# Patient Record
Sex: MALE | Race: WHITE
[De-identification: names, ages, dates, MRNs, and addresses within clinical notes are randomized per-mention and may not be internally consistent; named-entity substitution may affect disease eponyms.]

---

## 2021-01-25 NOTE — NUR
Patient in room PCU 3018A. I have received report from WHITLEY ALICEA IN ER and had the 
opportunity to ask questions and assume patient care.

## 2021-01-25 NOTE — NUR
Problems reprioritized. Patient report given, questions answered & plan of care reviewed 
with WHITLEY BROUSSARD.

## 2021-01-26 NOTE — NUR
Patient in room PCU 3018. I have received report from  Mariana ARREAGA  and had the opportunity to 
ask questions and assume patient care.

## 2021-01-26 NOTE — NUR
Problems reprioritized. Patient report given, questions answered & plan of care reviewed 
with Mariana ARREAGA.

## 2021-01-27 NOTE — NUR
Problems reprioritized. Patient report given, questions answered & plan of care reviewed 
with WHITLEY Mercado.

## 2021-01-27 NOTE — NUR
Patient in room PCU 3018. I have received report from ricky RIVAS   and had the opportunity to 
ask questions and assume patient care.

## 2021-01-27 NOTE — NUR
Patient in room PCU 3018. I have received report from WHITLEY Greer   and had the opportunity 
to ask questions and assume patient care.

## 2021-01-28 NOTE — NUR
Problems reprioritized. Patient report given, questions answered & plan of care reviewed 
with WHITLEY Yadav.

## 2021-01-28 NOTE — NUR
Patient in room PCU 3018. I have received report from  CARMEN ARREAGA  and had the opportunity 
to ask questions and assume patient care. PT SLEEPING. NO DISTRESS. CALL LIGHT IN REACH

-------------------------------------------------------------------------------

Addendum: 01/28/21 at 1158 by Vicenta Saucedo RN

-------------------------------------------------------------------------------

Amended: Links added.

## 2021-01-28 NOTE — NUR
Problems reprioritized. Patient report given, questions answered & plan of care reviewed 
with GENE ARREAGA. 

-------------------------------------------------------------------------------

Addendum: 01/28/21 at 1226 by Vicenta Saucedo RN

-------------------------------------------------------------------------------

Amended: Links added.

## 2021-01-28 NOTE — NUR
Patient in room PCU 3018. I have received report from WHITLEY Goodman   and had the opportunity 
to ask questions and assume patient care.

## 2021-01-28 NOTE — NUR
Patient in room PCU 3018. I have received report from  Vicenta Saucedo RN and had the 
opportunity to ask questions and assume patient care.

## 2021-01-29 NOTE — NUR
Patient in room PCU 3018. I have received report from  WHITLEY Mercado  and had the opportunity 
to ask questions and assume patient care.

## 2021-01-29 NOTE — NUR
Problems reprioritized. Patient report given, questions answered & plan of care reviewed 
with WHITLEY Goodman.

## 2021-01-29 NOTE — NUR
Patient stable for discharge per md orders. Strict return precautions given to patient 
including follow up with Dr. Pineda in two weeks. Urinary catheter leg bag given to 
patient. IV taken out with cannula intact. Ride to the [employer] [facility] was arranged 
but patient refused, and stated " I'd rather walk." Patient belongings gathered. Telemetry 
monitor taken off, wrist bands cut off. Patient ambulated to lobby and outside into parking 
lot, where he was seen leaving premises.

## 2021-06-20 ENCOUNTER — HOSPITAL ENCOUNTER (EMERGENCY)
Dept: HOSPITAL 94 - ER | Age: 56
Discharge: HOME | End: 2021-06-20
Payer: MEDICAID

## 2021-06-20 VITALS — DIASTOLIC BLOOD PRESSURE: 74 MMHG | SYSTOLIC BLOOD PRESSURE: 135 MMHG

## 2021-06-20 VITALS — HEIGHT: 70 IN | WEIGHT: 147.71 LBS | BODY MASS INDEX: 21.15 KG/M2

## 2021-06-20 DIAGNOSIS — G89.29: ICD-10-CM

## 2021-06-20 DIAGNOSIS — Z79.2: ICD-10-CM

## 2021-06-20 DIAGNOSIS — J44.9: ICD-10-CM

## 2021-06-20 DIAGNOSIS — Z90.89: ICD-10-CM

## 2021-06-20 DIAGNOSIS — M67.432: ICD-10-CM

## 2021-06-20 DIAGNOSIS — F15.90: ICD-10-CM

## 2021-06-20 DIAGNOSIS — Z79.899: ICD-10-CM

## 2021-06-20 DIAGNOSIS — E78.00: ICD-10-CM

## 2021-06-20 DIAGNOSIS — F41.9: ICD-10-CM

## 2021-06-20 DIAGNOSIS — I10: ICD-10-CM

## 2021-06-20 DIAGNOSIS — Z59.0: ICD-10-CM

## 2021-06-20 DIAGNOSIS — F17.200: ICD-10-CM

## 2021-06-20 DIAGNOSIS — L03.114: Primary | ICD-10-CM

## 2021-06-20 DIAGNOSIS — F12.90: ICD-10-CM

## 2021-06-20 DIAGNOSIS — Z87.440: ICD-10-CM

## 2021-06-20 PROCEDURE — 99284 EMERGENCY DEPT VISIT MOD MDM: CPT

## 2021-06-20 PROCEDURE — 10160 PNXR ASPIR ABSC HMTMA BULLA: CPT

## 2021-06-20 SDOH — ECONOMIC STABILITY - HOUSING INSECURITY: HOMELESSNESS: Z59.0

## 2021-06-20 NOTE — NUR
ABOUT A 3 CM BY 3 CM RAISED LUMP ON MEDIAL LEFT WRIST ASPIRATED BY DR TERAN: 
FLUID CLEAR: PATIETN AWARE THAT THE LUMP MAY BE A GANGLION CYST

## 2022-04-26 ENCOUNTER — HOSPITAL ENCOUNTER (EMERGENCY)
Dept: HOSPITAL 94 - ER | Age: 57
LOS: 1 days | Discharge: HOME | End: 2022-04-27
Payer: MEDICAID

## 2022-04-26 VITALS — SYSTOLIC BLOOD PRESSURE: 130 MMHG | DIASTOLIC BLOOD PRESSURE: 96 MMHG

## 2022-04-26 VITALS — WEIGHT: 216.27 LBS | BODY MASS INDEX: 30.96 KG/M2 | HEIGHT: 70 IN

## 2022-04-26 DIAGNOSIS — Z90.89: ICD-10-CM

## 2022-04-26 DIAGNOSIS — Z59.00: ICD-10-CM

## 2022-04-26 DIAGNOSIS — F15.90: ICD-10-CM

## 2022-04-26 DIAGNOSIS — F12.90: ICD-10-CM

## 2022-04-26 DIAGNOSIS — I10: ICD-10-CM

## 2022-04-26 DIAGNOSIS — G89.29: ICD-10-CM

## 2022-04-26 DIAGNOSIS — F41.9: ICD-10-CM

## 2022-04-26 DIAGNOSIS — J44.9: ICD-10-CM

## 2022-04-26 DIAGNOSIS — Z79.899: ICD-10-CM

## 2022-04-26 DIAGNOSIS — E78.00: ICD-10-CM

## 2022-04-26 DIAGNOSIS — Z87.440: ICD-10-CM

## 2022-04-26 DIAGNOSIS — Z79.2: ICD-10-CM

## 2022-04-26 DIAGNOSIS — K13.0: Primary | ICD-10-CM

## 2022-04-26 DIAGNOSIS — F17.200: ICD-10-CM

## 2022-04-26 PROCEDURE — 83605 ASSAY OF LACTIC ACID: CPT

## 2022-04-26 PROCEDURE — 99283 EMERGENCY DEPT VISIT LOW MDM: CPT

## 2022-04-26 PROCEDURE — 80053 COMPREHEN METABOLIC PANEL: CPT

## 2022-04-26 PROCEDURE — 85025 COMPLETE CBC W/AUTO DIFF WBC: CPT

## 2022-04-26 PROCEDURE — 36415 COLL VENOUS BLD VENIPUNCTURE: CPT

## 2022-04-26 SDOH — ECONOMIC STABILITY - HOUSING INSECURITY: HOMELESSNESS UNSPECIFIED: Z59.00

## 2022-04-27 LAB
ALBUMIN SERPL BCP-MCNC: 3.4 G/DL (ref 3.4–5)
ALBUMIN/GLOB SERPL: 0.8 {RATIO} (ref 1.1–1.5)
ALP SERPL-CCNC: 86 IU/L (ref 46–116)
ALT SERPL W P-5'-P-CCNC: 23 U/L (ref 12–78)
ANION GAP SERPL CALCULATED.3IONS-SCNC: 10 MMOL/L (ref 8–16)
AST SERPL W P-5'-P-CCNC: 14 U/L (ref 10–37)
BASOPHILS # BLD AUTO: 0.1 X10'3 (ref 0–0.2)
BASOPHILS NFR BLD AUTO: 0.6 % (ref 0–1)
BILIRUB SERPL-MCNC: 0.6 MG/DL (ref 0.1–1)
BUN SERPL-MCNC: 20 MG/DL (ref 7–18)
BUN/CREAT SERPL: 15.9 (ref 5.4–32)
CALCIUM SERPL-MCNC: 8.6 MG/DL (ref 8.5–10.1)
CHLORIDE SERPL-SCNC: 102 MMOL/L (ref 99–107)
CO2 SERPL-SCNC: 24 MMOL/L (ref 24–32)
CREAT SERPL-MCNC: 1.26 MG/DL (ref 0.6–1.1)
EOSINOPHIL # BLD AUTO: 0.2 X10'3 (ref 0–0.9)
EOSINOPHIL NFR BLD AUTO: 2.4 % (ref 0–6)
ERYTHROCYTE [DISTWIDTH] IN BLOOD BY AUTOMATED COUNT: 14.7 % (ref 11.5–14.5)
GFR SERPL CREATININE-BSD FRML MDRD: 59 ML/MIN
GLUCOSE SERPL-MCNC: 102 MG/DL (ref 70–104)
HCT VFR BLD AUTO: 39.7 % (ref 42–52)
HGB BLD-MCNC: 13.2 G/DL (ref 14–17.9)
LYMPHOCYTES # BLD AUTO: 1.8 X10'3 (ref 1.1–4.8)
LYMPHOCYTES NFR BLD AUTO: 18.5 % (ref 21–51)
MCH RBC QN AUTO: 27.1 PG (ref 27–31)
MCHC RBC AUTO-ENTMCNC: 33.2 G/DL (ref 33–36.5)
MCV RBC AUTO: 81.8 FL (ref 78–98)
MONOCYTES # BLD AUTO: 0.7 X10'3 (ref 0–0.9)
MONOCYTES NFR BLD AUTO: 7.2 % (ref 2–12)
NEUTROPHILS # BLD AUTO: 6.8 X10'3 (ref 1.8–7.7)
NEUTROPHILS NFR BLD AUTO: 71.3 % (ref 42–75)
PLATELET # BLD AUTO: 262 X10'3 (ref 140–440)
PMV BLD AUTO: 7.2 FL (ref 7.4–10.4)
POTASSIUM SERPL-SCNC: 4 MMOL/L (ref 3.5–5.1)
PROT SERPL-MCNC: 7.9 G/DL (ref 6.4–8.2)
RBC # BLD AUTO: 4.86 X10'6 (ref 4.7–6.1)
SODIUM SERPL-SCNC: 136 MMOL/L (ref 135–145)
WBC # BLD AUTO: 9.6 X10'3 (ref 4.5–11)

## 2022-06-27 ENCOUNTER — HOSPITAL ENCOUNTER (EMERGENCY)
Dept: HOSPITAL 94 - ER | Age: 57
Discharge: HOME | End: 2022-06-27
Payer: MEDICAID

## 2022-06-27 VITALS — WEIGHT: 145.31 LBS | HEIGHT: 70 IN | BODY MASS INDEX: 20.8 KG/M2

## 2022-06-27 VITALS — DIASTOLIC BLOOD PRESSURE: 91 MMHG | SYSTOLIC BLOOD PRESSURE: 136 MMHG

## 2022-06-27 DIAGNOSIS — R41.82: ICD-10-CM

## 2022-06-27 DIAGNOSIS — T50.7X1A: Primary | ICD-10-CM
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
## 2022-06-27 LAB
ALBUMIN SERPL BCP-MCNC: 3.5 G/DL (ref 3.4–5)
ALBUMIN/GLOB SERPL: 0.9 {RATIO} (ref 1.1–1.5)
ALP SERPL-CCNC: 73 IU/L (ref 46–116)
ALT SERPL W P-5'-P-CCNC: 19 U/L (ref 12–78)
ANION GAP SERPL CALCULATED.3IONS-SCNC: 7 MMOL/L (ref 8–16)
AST SERPL W P-5'-P-CCNC: 11 U/L (ref 10–37)
BASOPHILS # BLD AUTO: 0.1 X10'3 (ref 0–0.2)
BASOPHILS NFR BLD AUTO: 0.6 % (ref 0–1)
BILIRUB SERPL-MCNC: 0.3 MG/DL (ref 0.1–1)
BUN SERPL-MCNC: 22 MG/DL (ref 7–18)
BUN/CREAT SERPL: 17.2 (ref 5.4–32)
CALCIUM SERPL-MCNC: 8.3 MG/DL (ref 8.5–10.1)
CHLORIDE SERPL-SCNC: 108 MMOL/L (ref 99–107)
CO2 SERPL-SCNC: 26.7 MMOL/L (ref 24–32)
CREAT SERPL-MCNC: 1.28 MG/DL (ref 0.6–1.1)
EOSINOPHIL # BLD AUTO: 0.2 X10'3 (ref 0–0.9)
EOSINOPHIL NFR BLD AUTO: 2.3 % (ref 0–6)
ERYTHROCYTE [DISTWIDTH] IN BLOOD BY AUTOMATED COUNT: 15.1 % (ref 11.5–14.5)
GFR SERPL CREATININE-BSD FRML MDRD: 58 ML/MIN
GLUCOSE SERPL-MCNC: 125 MG/DL (ref 70–104)
HCT VFR BLD AUTO: 37.7 % (ref 42–52)
HGB BLD-MCNC: 12.3 G/DL (ref 14–17.9)
LYMPHOCYTES # BLD AUTO: 1 X10'3 (ref 1.1–4.8)
LYMPHOCYTES NFR BLD AUTO: 10.5 % (ref 21–51)
MCH RBC QN AUTO: 27 PG (ref 27–31)
MCHC RBC AUTO-ENTMCNC: 32.7 G/DL (ref 33–36.5)
MCV RBC AUTO: 82.5 FL (ref 78–98)
MONOCYTES # BLD AUTO: 0.4 X10'3 (ref 0–0.9)
MONOCYTES NFR BLD AUTO: 4.7 % (ref 2–12)
NEUTROPHILS # BLD AUTO: 7.6 X10'3 (ref 1.8–7.7)
NEUTROPHILS NFR BLD AUTO: 81.9 % (ref 42–75)
PLATELET # BLD AUTO: 242 X10'3 (ref 140–440)
PMV BLD AUTO: 7 FL (ref 7.4–10.4)
POTASSIUM SERPL-SCNC: 3.6 MMOL/L (ref 3.5–5.1)
PROT SERPL-MCNC: 7.4 G/DL (ref 6.4–8.2)
RBC # BLD AUTO: 4.57 X10'6 (ref 4.7–6.1)
SODIUM SERPL-SCNC: 142 MMOL/L (ref 135–145)
WBC # BLD AUTO: 9.2 X10'3 (ref 4.5–11)

## 2022-06-27 PROCEDURE — 36415 COLL VENOUS BLD VENIPUNCTURE: CPT

## 2022-06-27 PROCEDURE — 80053 COMPREHEN METABOLIC PANEL: CPT

## 2022-06-27 PROCEDURE — 85025 COMPLETE CBC W/AUTO DIFF WBC: CPT

## 2022-06-27 PROCEDURE — 93005 ELECTROCARDIOGRAM TRACING: CPT

## 2022-06-27 PROCEDURE — 99284 EMERGENCY DEPT VISIT MOD MDM: CPT

## 2022-06-27 PROCEDURE — 82948 REAGENT STRIP/BLOOD GLUCOSE: CPT

## 2025-06-22 ENCOUNTER — HOSPITAL ENCOUNTER (EMERGENCY)
Dept: HOSPITAL 94 - ER | Age: 60
Discharge: HOME | End: 2025-06-22
Payer: MEDICAID

## 2025-06-22 VITALS — BODY MASS INDEX: 20.41 KG/M2 | HEIGHT: 69 IN | WEIGHT: 137.79 LBS

## 2025-06-22 VITALS
DIASTOLIC BLOOD PRESSURE: 90 MMHG | OXYGEN SATURATION: 99 % | HEART RATE: 80 BPM | SYSTOLIC BLOOD PRESSURE: 145 MMHG | TEMPERATURE: 98.6 F | RESPIRATION RATE: 16 BRPM

## 2025-06-22 DIAGNOSIS — F12.90: ICD-10-CM

## 2025-06-22 DIAGNOSIS — Z90.89: ICD-10-CM

## 2025-06-22 DIAGNOSIS — T81.89XA: Primary | ICD-10-CM
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
## 2025-06-22 DIAGNOSIS — I10: ICD-10-CM

## 2025-06-22 DIAGNOSIS — J44.9: ICD-10-CM

## 2025-06-22 DIAGNOSIS — E78.00: ICD-10-CM

## 2025-06-22 DIAGNOSIS — F41.9: ICD-10-CM

## 2025-06-22 DIAGNOSIS — F15.90: ICD-10-CM

## 2025-06-22 PROCEDURE — 73630 X-RAY EXAM OF FOOT: CPT

## 2025-06-22 PROCEDURE — 99284 EMERGENCY DEPT VISIT MOD MDM: CPT

## 2025-06-22 PROCEDURE — 73610 X-RAY EXAM OF ANKLE: CPT
